# Patient Record
Sex: FEMALE | Race: WHITE
[De-identification: names, ages, dates, MRNs, and addresses within clinical notes are randomized per-mention and may not be internally consistent; named-entity substitution may affect disease eponyms.]

---
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
## 2024-03-19 ENCOUNTER — TRANSCRIPTION ENCOUNTER (OUTPATIENT)
Age: 19
End: 2024-03-19

## 2024-03-19 ENCOUNTER — OUTPATIENT (OUTPATIENT)
Dept: INPATIENT UNIT | Facility: HOSPITAL | Age: 19
LOS: 1 days | Discharge: HOME | End: 2024-03-19
Payer: MEDICAID

## 2024-03-19 VITALS
SYSTOLIC BLOOD PRESSURE: 115 MMHG | RESPIRATION RATE: 18 BRPM | TEMPERATURE: 99 F | DIASTOLIC BLOOD PRESSURE: 58 MMHG | HEIGHT: 65 IN | WEIGHT: 166.89 LBS | HEART RATE: 74 BPM

## 2024-03-19 DIAGNOSIS — O26.893 OTHER SPECIFIED PREGNANCY RELATED CONDITIONS, THIRD TRIMESTER: ICD-10-CM

## 2024-03-19 LAB
AMPHET UR-MCNC: NEGATIVE — SIGNIFICANT CHANGE UP
APPEARANCE UR: CLEAR — SIGNIFICANT CHANGE UP
BACTERIA # UR AUTO: NEGATIVE /HPF — SIGNIFICANT CHANGE UP
BARBITURATES UR SCN-MCNC: NEGATIVE — SIGNIFICANT CHANGE UP
BASOPHILS # BLD AUTO: 0.02 K/UL — SIGNIFICANT CHANGE UP (ref 0–0.2)
BASOPHILS NFR BLD AUTO: 0.2 % — SIGNIFICANT CHANGE UP (ref 0–1)
BENZODIAZ UR-MCNC: NEGATIVE — SIGNIFICANT CHANGE UP
BILIRUB UR-MCNC: NEGATIVE — SIGNIFICANT CHANGE UP
BLD GP AB SCN SERPL QL: SIGNIFICANT CHANGE UP
BUPRENORPHINE SCREEN, URINE RESULT: NEGATIVE — SIGNIFICANT CHANGE UP
CAST: 1 /LPF — SIGNIFICANT CHANGE UP (ref 0–4)
COCAINE METAB.OTHER UR-MCNC: NEGATIVE — SIGNIFICANT CHANGE UP
COLOR SPEC: YELLOW — SIGNIFICANT CHANGE UP
DIFF PNL FLD: NEGATIVE — SIGNIFICANT CHANGE UP
EOSINOPHIL # BLD AUTO: 0.02 K/UL — SIGNIFICANT CHANGE UP (ref 0–0.7)
EOSINOPHIL NFR BLD AUTO: 0.2 % — SIGNIFICANT CHANGE UP (ref 0–8)
FENTANYL UR QL: NEGATIVE — SIGNIFICANT CHANGE UP
GLUCOSE UR QL: NEGATIVE MG/DL — SIGNIFICANT CHANGE UP
HCT VFR BLD CALC: 36.6 % — LOW (ref 37–47)
HGB BLD-MCNC: 12.8 G/DL — SIGNIFICANT CHANGE UP (ref 12–16)
IMM GRANULOCYTES NFR BLD AUTO: 1 % — HIGH (ref 0.1–0.3)
KETONES UR-MCNC: NEGATIVE MG/DL — SIGNIFICANT CHANGE UP
L&D DRUG SCREEN, URINE: SIGNIFICANT CHANGE UP
LEUKOCYTE ESTERASE UR-ACNC: ABNORMAL
LYMPHOCYTES # BLD AUTO: 1.76 K/UL — SIGNIFICANT CHANGE UP (ref 1.2–3.4)
LYMPHOCYTES # BLD AUTO: 15.1 % — LOW (ref 20.5–51.1)
MCHC RBC-ENTMCNC: 32.4 PG — HIGH (ref 27–31)
MCHC RBC-ENTMCNC: 35 G/DL — SIGNIFICANT CHANGE UP (ref 32–37)
MCV RBC AUTO: 92.7 FL — SIGNIFICANT CHANGE UP (ref 81–99)
METHADONE UR-MCNC: NEGATIVE — SIGNIFICANT CHANGE UP
MONOCYTES # BLD AUTO: 0.74 K/UL — HIGH (ref 0.1–0.6)
MONOCYTES NFR BLD AUTO: 6.3 % — SIGNIFICANT CHANGE UP (ref 1.7–9.3)
NEUTROPHILS # BLD AUTO: 9.03 K/UL — HIGH (ref 1.4–6.5)
NEUTROPHILS NFR BLD AUTO: 77.2 % — HIGH (ref 42.2–75.2)
NITRITE UR-MCNC: NEGATIVE — SIGNIFICANT CHANGE UP
NRBC # BLD: 0 /100 WBCS — SIGNIFICANT CHANGE UP (ref 0–0)
OPIATES UR-MCNC: NEGATIVE — SIGNIFICANT CHANGE UP
OXYCODONE UR-MCNC: NEGATIVE — SIGNIFICANT CHANGE UP
PCP UR-MCNC: NEGATIVE — SIGNIFICANT CHANGE UP
PH UR: 7 — SIGNIFICANT CHANGE UP (ref 5–8)
PLATELET # BLD AUTO: 170 K/UL — SIGNIFICANT CHANGE UP (ref 130–400)
PMV BLD: 10.5 FL — HIGH (ref 7.4–10.4)
PRENATAL SYPHILIS TEST: SIGNIFICANT CHANGE UP
PROPOXYPHENE QUALITATIVE URINE RESULT: NEGATIVE — SIGNIFICANT CHANGE UP
PROT UR-MCNC: NEGATIVE MG/DL — SIGNIFICANT CHANGE UP
RBC # BLD: 3.95 M/UL — LOW (ref 4.2–5.4)
RBC # FLD: 12.8 % — SIGNIFICANT CHANGE UP (ref 11.5–14.5)
RBC CASTS # UR COMP ASSIST: 0 /HPF — SIGNIFICANT CHANGE UP (ref 0–4)
SP GR SPEC: 1 — SIGNIFICANT CHANGE UP (ref 1–1.03)
SQUAMOUS # UR AUTO: 5 /HPF — SIGNIFICANT CHANGE UP (ref 0–5)
UROBILINOGEN FLD QL: 0.2 MG/DL — SIGNIFICANT CHANGE UP (ref 0.2–1)
WBC # BLD: 11.69 K/UL — HIGH (ref 4.8–10.8)
WBC # FLD AUTO: 11.69 K/UL — HIGH (ref 4.8–10.8)
WBC UR QL: 3 /HPF — SIGNIFICANT CHANGE UP (ref 0–5)

## 2024-03-19 PROCEDURE — 86900 BLOOD TYPING SEROLOGIC ABO: CPT

## 2024-03-19 PROCEDURE — 80354 DRUG SCREENING FENTANYL: CPT

## 2024-03-19 PROCEDURE — 86592 SYPHILIS TEST NON-TREP QUAL: CPT

## 2024-03-19 PROCEDURE — 80307 DRUG TEST PRSMV CHEM ANLYZR: CPT

## 2024-03-19 PROCEDURE — 81001 URINALYSIS AUTO W/SCOPE: CPT

## 2024-03-19 PROCEDURE — 86850 RBC ANTIBODY SCREEN: CPT

## 2024-03-19 PROCEDURE — 85025 COMPLETE CBC W/AUTO DIFF WBC: CPT

## 2024-03-19 PROCEDURE — 36415 COLL VENOUS BLD VENIPUNCTURE: CPT

## 2024-03-19 PROCEDURE — 86901 BLOOD TYPING SEROLOGIC RH(D): CPT

## 2024-03-19 RX ORDER — AMPICILLIN TRIHYDRATE 250 MG
1 CAPSULE ORAL EVERY 4 HOURS
Refills: 0 | Status: DISCONTINUED | OUTPATIENT
Start: 2024-03-19 | End: 2024-03-19

## 2024-03-19 RX ORDER — INFLUENZA VIRUS VACCINE 15; 15; 15; 15 UG/.5ML; UG/.5ML; UG/.5ML; UG/.5ML
0.5 SUSPENSION INTRAMUSCULAR ONCE
Refills: 0 | Status: DISCONTINUED | OUTPATIENT
Start: 2024-03-19 | End: 2024-03-19

## 2024-03-19 RX ORDER — AMPICILLIN TRIHYDRATE 250 MG
2 CAPSULE ORAL ONCE
Refills: 0 | Status: DISCONTINUED | OUTPATIENT
Start: 2024-03-19 | End: 2024-03-19

## 2024-03-19 RX ORDER — SODIUM CHLORIDE 9 MG/ML
1000 INJECTION, SOLUTION INTRAVENOUS
Refills: 0 | Status: DISCONTINUED | OUTPATIENT
Start: 2024-03-19 | End: 2024-03-19

## 2024-03-19 RX ORDER — OXYTOCIN 10 UNIT/ML
333.33 VIAL (ML) INJECTION
Qty: 20 | Refills: 0 | Status: DISCONTINUED | OUTPATIENT
Start: 2024-03-19 | End: 2024-03-19

## 2024-03-19 NOTE — OB PROVIDER H&P - ATTENDING COMMENTS
18 yr old  at 39.3  wks sent by PMD for IOL for oligohydramnios.  Sono in office showed MVP  of 0.9 cm.   In hospital, found to have MVP of 5cm. Patient declines IOL.  - continue routine prenatal care  - warning signs reviewed

## 2024-03-19 NOTE — OB RN PATIENT PROFILE - NS_PRENATALSTART_OBGYN_ALL_OB
From: Lyudmila Zuñiga  To: Kodak Beatty  Sent: 1/4/2024 6:54 AM CST  Subject: LINGERING cold and Symptoms     Good Morning,  I have had a ge oral cold since Dec 9th. I still have residual cold symptoms and now I ha e a cold sore bump Ppaearing on my lower lip. Is there something thT you can.preacribe for the discomfort?    Thanks  Lyudmila  
Spoke to patient and informed her per Dr. Beatty,   Antiviral medications will not work now   She can try over-the-counter lip balm's for symptom relief from pain and cracking   It will subside by itself    Patient verbalized understanding.       
Started first trimester

## 2024-03-19 NOTE — OB PROVIDER H&P - HISTORY OF PRESENT ILLNESS
18 yr old  at 3  wks sent by PMD for IOL for oligohydramnios.  Sono in office showed MVP  of 0.9 cm.  Pt is GBS pos.    18 yr old  at 39.3  wks sent by PMD for IOL for oligohydramnios.  Sono in office showed MVP  of 0.9 cm. Pt had also mentioned that FM was still present but not as active as before.  Reports she feel normal FM since visit and active since arrival in L&D.

## 2024-03-19 NOTE — DISCHARGE NOTE ANTEPARTUM - CARE PROVIDER_API CALL
Silvia Saab  Obstetrics and Gynecology  [address] [place] [address], [address]  [place] [address]  Phone: [phone]  Fax: [phone]  Follow Up Time:

## 2024-03-19 NOTE — OB PROVIDER H&P - PRO PRENATAL LABS ORI SOURCE BLOOD TYPE
,   Please review the patient's incoming Conjectt message.     hard copy, drawn during this pregnancy

## 2024-03-19 NOTE — OB PROVIDER H&P - NSLOWPPHRISK_OBGYN_A_OB
No previous uterine incision/Avila Pregnancy/Less than or equal to 4 previous vaginal births/No known bleeding disorder/No history of postpartum hemorrhage/No other PPH risks indicated

## 2024-03-19 NOTE — OB PROVIDER H&P - ASSESSMENT
18 yr old  at  18 yr old  at 39w3d with MVP of 5.03 cm, reassuring maternal and fetal well-being.    d/c to home  Reviewed with pt normal MVP that IOL is not necessary  Reviewed findings with Dr Saab and Dr Caal- agree with plan  f/u PMD this week    Dr Saab aware  Dr Kierra Melgar aware

## 2024-03-19 NOTE — DISCHARGE NOTE ANTEPARTUM - CARE PLAN
1 Principal Discharge DX:	39 weeks gestation of pregnancy  Assessment and plan of treatment:	If you have severe or regular abdominal cramping, if you think you broke your water, or if you have vaginal bleeding, call your Ob/Gyn or come back to labor and delivery.  Follow up with your Ob/Gyn as scheduled.

## 2024-03-19 NOTE — OB PROVIDER H&P - NSHPPHYSICALEXAM_GEN_ALL_CORE
Vital Signs (24 Hrs):  T(C): 37 (03-19-24 @ 17:43), Max: 37 (03-19-24 @ 17:43)  HR: 74 (03-19-24 @ 17:43) (74 - 74)  BP: 115/58 (03-19-24 @ 17:43) (115/58 - 115/58)  RR: 18 (03-19-24 @ 17:43) (18 - 18)    Gen: NAD  Abd: gravid, soft NT  VE:deferred  FHR: 130/mod/+accels  TOCO: irritability

## 2024-03-19 NOTE — OB PROVIDER H&P - NSOBVTERISKREFER_OBGYN_ALL_OB
PROCEDURE  ECG 12 Lead Documentation  Date/Time: 3/7/2019 8:05 AM  Performed by: Jose Rushing MD  Authorized by: Jose Rushing MD     Indications / Diagnosis:  Diaphoresis  ECG reviewed by me, the ED Provider: yes    Patient location:  ED  Previous ECG:     Previous ECG:  Unavailable    Comparison to cardiac monitor: Yes    Interpretation:     Interpretation: non-specific    Quality:     Tracing quality:  Limited by artifact  Rate:     ECG rate:  91    ECG rate assessment: normal    Rhythm:     Rhythm: sinus rhythm    Ectopy:     Ectopy: none    QRS:     QRS axis:  Normal  Conduction:     Conduction: normal    ST segments:     ST segments:  Normal  T waves:     T waves: flattening      Flattening:  V1, I, aVF and aVR  Other findings:     Other findings: prolonged qTc interval           Jose Rushing MD  03/07/19 8754 Refer to the Assessment tab to view/cancel completed assessment.

## 2024-03-19 NOTE — DISCHARGE NOTE ANTEPARTUM - PATIENT PORTAL LINK FT
You can access the FollowMyHealth Patient Portal offered by [facility] by registering at the following website: [email or web address]. By joining [facility]’s FollowMyHealth portal, you will also be able to view your health information using other applications (apps) compatible with our system.

## 2024-03-19 NOTE — OB RN PATIENT PROFILE - HAS THE PATIENT RECEIVED THE INFLUENZA VACCINE THIS SEASON?
August 17, 2022     Patient: Lila Gonzalez  YOB: 1971  Date of Visit: 8/17/2022      To Whom it May Concern:    Esther Gonzalez is under my professional care  Lila Trotter was seen in my office on 8/17/2022  Lila Trotter will be excused from work starting from 08/15/2022  She may return back to work with no restrictions on 08/22/2022  If you have any questions or concerns, please don't hesitate to call           Sincerely,          Laisha Fleming MD        CC: No Recipients no...

## 2024-03-24 DIAGNOSIS — O99.820 STREPTOCOCCUS B CARRIER STATE COMPLICATING PREGNANCY: ICD-10-CM

## 2024-03-24 DIAGNOSIS — Z3A.39 39 WEEKS GESTATION OF PREGNANCY: ICD-10-CM

## 2024-03-24 DIAGNOSIS — Z88.1 ALLERGY STATUS TO OTHER ANTIBIOTIC AGENTS STATUS: ICD-10-CM

## 2024-03-24 DIAGNOSIS — O41.03X0 OLIGOHYDRAMNIOS, THIRD TRIMESTER, NOT APPLICABLE OR UNSPECIFIED: ICD-10-CM

## 2024-03-24 DIAGNOSIS — Z53.29 PROCEDURE AND TREATMENT NOT CARRIED OUT BECAUSE OF PATIENT'S DECISION FOR OTHER REASONS: ICD-10-CM

## 2024-03-28 ENCOUNTER — INPATIENT (INPATIENT)
Facility: HOSPITAL | Age: 19
LOS: 0 days | Discharge: HOME | DRG: 566 | End: 2024-03-29
Attending: OBSTETRICS & GYNECOLOGY | Admitting: OBSTETRICS & GYNECOLOGY
Payer: MEDICAID

## 2024-03-28 VITALS
SYSTOLIC BLOOD PRESSURE: 94 MMHG | WEIGHT: 167.99 LBS | TEMPERATURE: 98 F | DIASTOLIC BLOOD PRESSURE: 52 MMHG | HEIGHT: 65 IN | RESPIRATION RATE: 20 BRPM | HEART RATE: 67 BPM

## 2024-03-28 DIAGNOSIS — O26.899 OTHER SPECIFIED PREGNANCY RELATED CONDITIONS, UNSPECIFIED TRIMESTER: ICD-10-CM

## 2024-03-28 DIAGNOSIS — O26.893 OTHER SPECIFIED PREGNANCY RELATED CONDITIONS, THIRD TRIMESTER: ICD-10-CM

## 2024-03-28 PROBLEM — Z78.9 OTHER SPECIFIED HEALTH STATUS: Chronic | Status: ACTIVE | Noted: 2024-03-19

## 2024-03-28 LAB
APPEARANCE UR: ABNORMAL
BASOPHILS # BLD AUTO: 0.04 K/UL — SIGNIFICANT CHANGE UP (ref 0–0.2)
BASOPHILS NFR BLD AUTO: 0.3 % — SIGNIFICANT CHANGE UP (ref 0–1)
BILIRUB UR-MCNC: NEGATIVE — SIGNIFICANT CHANGE UP
COLOR SPEC: YELLOW — SIGNIFICANT CHANGE UP
DIFF PNL FLD: ABNORMAL
EOSINOPHIL # BLD AUTO: 0.03 K/UL — SIGNIFICANT CHANGE UP (ref 0–0.7)
EOSINOPHIL NFR BLD AUTO: 0.2 % — SIGNIFICANT CHANGE UP (ref 0–8)
GLUCOSE UR QL: NEGATIVE MG/DL — SIGNIFICANT CHANGE UP
HCT VFR BLD CALC: 37.2 % — SIGNIFICANT CHANGE UP (ref 37–47)
HGB BLD-MCNC: 12.9 G/DL — SIGNIFICANT CHANGE UP (ref 12–16)
IMM GRANULOCYTES NFR BLD AUTO: 0.6 % — HIGH (ref 0.1–0.3)
KETONES UR-MCNC: NEGATIVE MG/DL — SIGNIFICANT CHANGE UP
L&D DRUG SCREEN, URINE: SIGNIFICANT CHANGE UP
LEUKOCYTE ESTERASE UR-ACNC: ABNORMAL
LYMPHOCYTES # BLD AUTO: 1.72 K/UL — SIGNIFICANT CHANGE UP (ref 1.2–3.4)
LYMPHOCYTES # BLD AUTO: 12.5 % — LOW (ref 20.5–51.1)
MCHC RBC-ENTMCNC: 31.9 PG — HIGH (ref 27–31)
MCHC RBC-ENTMCNC: 34.7 G/DL — SIGNIFICANT CHANGE UP (ref 32–37)
MCV RBC AUTO: 92.1 FL — SIGNIFICANT CHANGE UP (ref 81–99)
MONOCYTES # BLD AUTO: 0.61 K/UL — HIGH (ref 0.1–0.6)
MONOCYTES NFR BLD AUTO: 4.4 % — SIGNIFICANT CHANGE UP (ref 1.7–9.3)
NEUTROPHILS # BLD AUTO: 11.31 K/UL — HIGH (ref 1.4–6.5)
NEUTROPHILS NFR BLD AUTO: 82 % — HIGH (ref 42.2–75.2)
NITRITE UR-MCNC: NEGATIVE — SIGNIFICANT CHANGE UP
NRBC # BLD: 0 /100 WBCS — SIGNIFICANT CHANGE UP (ref 0–0)
PH UR: 7 — SIGNIFICANT CHANGE UP (ref 5–8)
PLATELET # BLD AUTO: 142 K/UL — SIGNIFICANT CHANGE UP (ref 130–400)
PMV BLD: 10 FL — SIGNIFICANT CHANGE UP (ref 7.4–10.4)
PRENATAL SYPHILIS TEST: SIGNIFICANT CHANGE UP
PROT UR-MCNC: SIGNIFICANT CHANGE UP MG/DL
RBC # BLD: 4.04 M/UL — LOW (ref 4.2–5.4)
RBC # FLD: 12.8 % — SIGNIFICANT CHANGE UP (ref 11.5–14.5)
SP GR SPEC: 1.01 — SIGNIFICANT CHANGE UP (ref 1–1.03)
UROBILINOGEN FLD QL: 0.2 MG/DL — SIGNIFICANT CHANGE UP (ref 0.2–1)
WBC # BLD: 13.79 K/UL — HIGH (ref 4.8–10.8)
WBC # FLD AUTO: 13.79 K/UL — HIGH (ref 4.8–10.8)

## 2024-03-28 PROCEDURE — 86592 SYPHILIS TEST NON-TREP QUAL: CPT

## 2024-03-28 PROCEDURE — 86900 BLOOD TYPING SEROLOGIC ABO: CPT

## 2024-03-28 PROCEDURE — 86901 BLOOD TYPING SEROLOGIC RH(D): CPT

## 2024-03-28 PROCEDURE — 80307 DRUG TEST PRSMV CHEM ANLYZR: CPT

## 2024-03-28 PROCEDURE — 59050 FETAL MONITOR W/REPORT: CPT

## 2024-03-28 PROCEDURE — 36415 COLL VENOUS BLD VENIPUNCTURE: CPT

## 2024-03-28 PROCEDURE — 85025 COMPLETE CBC W/AUTO DIFF WBC: CPT

## 2024-03-28 PROCEDURE — 81001 URINALYSIS AUTO W/SCOPE: CPT

## 2024-03-28 PROCEDURE — 86850 RBC ANTIBODY SCREEN: CPT

## 2024-03-28 PROCEDURE — 80354 DRUG SCREENING FENTANYL: CPT

## 2024-03-28 RX ORDER — OXYTOCIN 10 UNIT/ML
333.33 VIAL (ML) INJECTION
Qty: 20 | Refills: 0 | Status: DISCONTINUED | OUTPATIENT
Start: 2024-03-28 | End: 2024-03-28

## 2024-03-28 RX ORDER — CHLORHEXIDINE GLUCONATE 213 G/1000ML
1 SOLUTION TOPICAL DAILY
Refills: 0 | Status: DISCONTINUED | OUTPATIENT
Start: 2024-03-28 | End: 2024-03-28

## 2024-03-28 RX ORDER — SODIUM CHLORIDE 9 MG/ML
1000 INJECTION, SOLUTION INTRAVENOUS
Refills: 0 | Status: DISCONTINUED | OUTPATIENT
Start: 2024-03-28 | End: 2024-03-28

## 2024-03-28 RX ORDER — LANOLIN
1 OINTMENT (GRAM) TOPICAL EVERY 6 HOURS
Refills: 0 | Status: DISCONTINUED | OUTPATIENT
Start: 2024-03-28 | End: 2024-03-29

## 2024-03-28 RX ORDER — IBUPROFEN 200 MG
600 TABLET ORAL EVERY 6 HOURS
Refills: 0 | Status: COMPLETED | OUTPATIENT
Start: 2024-03-28 | End: 2025-02-24

## 2024-03-28 RX ORDER — INFLUENZA VIRUS VACCINE 15; 15; 15; 15 UG/.5ML; UG/.5ML; UG/.5ML; UG/.5ML
0.5 SUSPENSION INTRAMUSCULAR ONCE
Refills: 0 | Status: DISCONTINUED | OUTPATIENT
Start: 2024-03-28 | End: 2024-03-29

## 2024-03-28 RX ORDER — TETANUS TOXOID, REDUCED DIPHTHERIA TOXOID AND ACELLULAR PERTUSSIS VACCINE, ADSORBED 5; 2.5; 8; 8; 2.5 [IU]/.5ML; [IU]/.5ML; UG/.5ML; UG/.5ML; UG/.5ML
0.5 SUSPENSION INTRAMUSCULAR ONCE
Refills: 0 | Status: DISCONTINUED | OUTPATIENT
Start: 2024-03-28 | End: 2024-03-29

## 2024-03-28 RX ORDER — AMPICILLIN TRIHYDRATE 250 MG
1 CAPSULE ORAL EVERY 4 HOURS
Refills: 0 | Status: DISCONTINUED | OUTPATIENT
Start: 2024-03-28 | End: 2024-03-28

## 2024-03-28 RX ORDER — CITRIC ACID/SODIUM CITRATE 300-500 MG
15 SOLUTION, ORAL ORAL EVERY 6 HOURS
Refills: 0 | Status: DISCONTINUED | OUTPATIENT
Start: 2024-03-28 | End: 2024-03-28

## 2024-03-28 RX ORDER — SODIUM CHLORIDE 9 MG/ML
3 INJECTION INTRAMUSCULAR; INTRAVENOUS; SUBCUTANEOUS EVERY 8 HOURS
Refills: 0 | Status: DISCONTINUED | OUTPATIENT
Start: 2024-03-28 | End: 2024-03-29

## 2024-03-28 RX ORDER — OXYCODONE HYDROCHLORIDE 5 MG/1
5 TABLET ORAL
Refills: 0 | Status: DISCONTINUED | OUTPATIENT
Start: 2024-03-28 | End: 2024-03-29

## 2024-03-28 RX ORDER — AMPICILLIN TRIHYDRATE 250 MG
2 CAPSULE ORAL ONCE
Refills: 0 | Status: COMPLETED | OUTPATIENT
Start: 2024-03-28 | End: 2024-03-28

## 2024-03-28 RX ORDER — OXYTOCIN 10 UNIT/ML
2 VIAL (ML) INJECTION
Qty: 30 | Refills: 0 | Status: DISCONTINUED | OUTPATIENT
Start: 2024-03-28 | End: 2024-03-28

## 2024-03-28 RX ORDER — OXYCODONE HYDROCHLORIDE 5 MG/1
5 TABLET ORAL ONCE
Refills: 0 | Status: DISCONTINUED | OUTPATIENT
Start: 2024-03-28 | End: 2024-03-29

## 2024-03-28 RX ORDER — IBUPROFEN 200 MG
600 TABLET ORAL EVERY 6 HOURS
Refills: 0 | Status: DISCONTINUED | OUTPATIENT
Start: 2024-03-28 | End: 2024-03-29

## 2024-03-28 RX ORDER — PRAMOXINE HYDROCHLORIDE 150 MG/15G
1 AEROSOL, FOAM RECTAL EVERY 4 HOURS
Refills: 0 | Status: DISCONTINUED | OUTPATIENT
Start: 2024-03-28 | End: 2024-03-29

## 2024-03-28 RX ORDER — DIBUCAINE 1 %
1 OINTMENT (GRAM) RECTAL EVERY 6 HOURS
Refills: 0 | Status: DISCONTINUED | OUTPATIENT
Start: 2024-03-28 | End: 2024-03-29

## 2024-03-28 RX ORDER — HYDROCORTISONE 1 %
1 OINTMENT (GRAM) TOPICAL EVERY 6 HOURS
Refills: 0 | Status: DISCONTINUED | OUTPATIENT
Start: 2024-03-28 | End: 2024-03-29

## 2024-03-28 RX ORDER — AER TRAVELER 0.5 G/1
1 SOLUTION RECTAL; TOPICAL EVERY 4 HOURS
Refills: 0 | Status: DISCONTINUED | OUTPATIENT
Start: 2024-03-28 | End: 2024-03-29

## 2024-03-28 RX ORDER — KETOROLAC TROMETHAMINE 30 MG/ML
30 SYRINGE (ML) INJECTION ONCE
Refills: 0 | Status: DISCONTINUED | OUTPATIENT
Start: 2024-03-28 | End: 2024-03-28

## 2024-03-28 RX ORDER — DIPHENHYDRAMINE HCL 50 MG
25 CAPSULE ORAL EVERY 6 HOURS
Refills: 0 | Status: DISCONTINUED | OUTPATIENT
Start: 2024-03-28 | End: 2024-03-29

## 2024-03-28 RX ORDER — SIMETHICONE 80 MG/1
80 TABLET, CHEWABLE ORAL EVERY 4 HOURS
Refills: 0 | Status: DISCONTINUED | OUTPATIENT
Start: 2024-03-28 | End: 2024-03-29

## 2024-03-28 RX ORDER — OXYTOCIN 10 UNIT/ML
41.67 VIAL (ML) INJECTION
Qty: 20 | Refills: 0 | Status: DISCONTINUED | OUTPATIENT
Start: 2024-03-28 | End: 2024-03-29

## 2024-03-28 RX ORDER — MAGNESIUM HYDROXIDE 400 MG/1
30 TABLET, CHEWABLE ORAL
Refills: 0 | Status: DISCONTINUED | OUTPATIENT
Start: 2024-03-28 | End: 2024-03-29

## 2024-03-28 RX ORDER — BENZOCAINE 10 %
1 GEL (GRAM) MUCOUS MEMBRANE EVERY 6 HOURS
Refills: 0 | Status: DISCONTINUED | OUTPATIENT
Start: 2024-03-28 | End: 2024-03-29

## 2024-03-28 RX ORDER — ACETAMINOPHEN 500 MG
975 TABLET ORAL
Refills: 0 | Status: DISCONTINUED | OUTPATIENT
Start: 2024-03-28 | End: 2024-03-29

## 2024-03-28 RX ADMIN — SODIUM CHLORIDE 125 MILLILITER(S): 9 INJECTION, SOLUTION INTRAVENOUS at 04:40

## 2024-03-28 RX ADMIN — Medication 2 MILLIUNIT(S)/MIN: at 11:02

## 2024-03-28 RX ADMIN — Medication 108 GRAM(S): at 12:20

## 2024-03-28 RX ADMIN — Medication 600 MILLIGRAM(S): at 23:00

## 2024-03-28 RX ADMIN — Medication 108 GRAM(S): at 08:26

## 2024-03-28 RX ADMIN — Medication 200 GRAM(S): at 04:20

## 2024-03-28 RX ADMIN — Medication 600 MILLIGRAM(S): at 21:58

## 2024-03-28 NOTE — OB PROVIDER H&P - ATTENDING COMMENTS
17yo  at 40w4d gestation who presented to L&D with SROM, in early labor.  Reassuring fetal status  GBS positive  Admit to L&D  Antibiotics for GBS prophylaxis  Epidural if desired for pain control  Pitocin if needed for augmentation  Anticipate vaginal delivery

## 2024-03-28 NOTE — OB PROVIDER H&P - HISTORY OF PRESENT ILLNESS
18yr old  at 40w4d, NICHOLE 3/24/24, dated by 1st trimester sono, presents to L&D for contractions. Pt had a large gush of fluid while in registration c/w SROM. Pt reports good fetal movement. She denies vaginal bleeding. She reports an uncomplicated pregnancy otherwise. GBS positive.

## 2024-03-28 NOTE — OB PROVIDER DELIVERY SUMMARY - NSSELHIDDEN_OBGYN_ALL_OB_FT
[NS_DeliveryAttending1_OBGYN_ALL_OB_FT:[id number]=],[NS_DeliveryRN_OBGYN_ALL_OB_FT:[id number]]

## 2024-03-28 NOTE — OB RN PATIENT PROFILE - FALL HARM RISK - UNIVERSAL INTERVENTIONS
Bed in lowest position, wheels locked, appropriate side rails in place/Call bell, personal items and telephone in reach/Instruct patient to call for assistance before getting out of bed or chair/Non-slip footwear when patient is out of bed/[place] to call system/Physically safe environment - no spills, clutter or unnecessary equipment/Purposeful Proactive Rounding/Room/bathroom lighting operational, light cord in reach

## 2024-03-28 NOTE — OB PROVIDER LABOR PROGRESS NOTE - NS_SUBJECTIVE/OBJECTIVE_OBGYN_ALL_OB_FT
Patient comfortable, s/p epidural    Vital Signs Last 24 Hrs  T(C): 36.5 (28 Mar 2024 05:36), Max: 36.5 (28 Mar 2024 05:36)  T(F): 97.7 (28 Mar 2024 05:36), Max: 97.7 (28 Mar 2024 05:36)  HR: 74 (28 Mar 2024 08:51) (50 - 84)  BP: 117/67 (28 Mar 2024 08:48) (88/49 - 124/70)  BP(mean): --  RR: 18 (28 Mar 2024 06:47) (18 - 20)  SpO2: 97% (28 Mar 2024 08:51) (91% - 99%)      Abdomen: soft between contractions, nontender, gravid
Patient feeling increased pressure.    Vital Signs Last 24 Hrs  T(C): 37.1 (28 Mar 2024 11:00), Max: 37.1 (28 Mar 2024 11:00)  T(F): 98.78 (28 Mar 2024 11:00), Max: 98.78 (28 Mar 2024 11:00)  HR: 78 (28 Mar 2024 12:36) (50 - 112)  BP: 113/57 (28 Mar 2024 12:32) (88/49 - 137/61)  BP(mean): --  RR: 18 (28 Mar 2024 06:47) (18 - 20)  SpO2: 95% (28 Mar 2024 12:36) (88% - 99%)

## 2024-03-28 NOTE — OB PROVIDER H&P - NSLOWPPHRISK_OBGYN_A_OB_CAL
renal replacement pts:no protein restr,no conc K & phos, low sodium/supplement (specify)/thin liquids 6

## 2024-03-28 NOTE — OB PROVIDER LABOR PROGRESS NOTE - NS_OBIHIFHRDETAILS_OBGYN_ALL_OB_FT
baseline 125, moderate variability, +accelerations, rare early decelerations
baseline 135, moderate variability, +accelerations, one late deceleration lasting 2 minutes with recovery after attempt to push

## 2024-03-28 NOTE — OB PROVIDER DELIVERY SUMMARY - NSPROVIDERDELIVERYNOTE_OBGYN_ALL_OB_FT
I was at the bedside of this 19 yo  at 40 5/7 weeks gestational age in active labor when the vaginal exam was completely dilated, completely effaced and +2 of 3 station. The patient was placed in dorsal lithotomy position and prepared and draped in the normal fashion. Under continuous external fetal heart rate monitoring, the patient was encouraged to push. With good maternal effort and vacuum assistance for maternal and fetal indications, she delivered a viable male infant with APGARs of 9 and 9, weighing 3775g.    Due to maternal exhaustion, persistent cat II FHT tracing, delivery assisted by Kiwi vacuum was offered. The patient was counseled and indications and risks were discussed. Questions were answered and the patient's consent was obtained. At this point, the cervix was completely dilated. Nursery and anesthesia were notified. Maternal fetal sizes were determined appropriate for application and the bladder had been emptied. The fetal station was noted to be low +3 and the fetal head was noted to be in the OA position. Kiwi vacuum was applied to the fetal head. Cup placement was determined to be appropriate and maternal tissue was noted to be excluded from the cup. The vacuum was placed on the fetal head and the pressure was kept in the recommended range per manufacture's instructions. Station was advanced with each pull. Total traction time was 1 minute with 2 pulls and 1 pop offs. Vacuum extraction of the head was successful and  head assessment was caput.    The fetal head delivered first in OA position.  Nuchal cord was not present.  Then, with gentle downward guidance, the right anterior fetal shoulder was delivered, followed by the left posterior fetal shoulder, followed by the remainder of the infant without difficulty.    The umbilical cord was clamped x 2 and cut in between.  The infant was vigorous and handed to pediatric team for assessment.    Cord blood/cord gasses were then collected.  The placenta delivered spontaneously and intact.  Three-vessel cord was noted. Pitocin infusion was started immediately after the delivery of the placenta. Uterine massage was given until the fundus was firm.     The cervix, perineum, vagina, and rectum were then carefully inspected for lacerations.  A second degree perineal laceration was noted and repaired using 2-0 chromic in routine fashion. Hemostasis was noted.    At the end of the procedure, all sponge, instrument, needle counts were correct x2. No sponges were left in the vagina.     was taken to NICU at the end of procedure. Patient was left in stable condition in the labor room.

## 2024-03-28 NOTE — OB PROVIDER H&P - NSHPLABSRESULTS_GEN_ALL_CORE
2/26/24  HIV NR  O POS, antibody negative  RPR NR  Rubella immune  Measles immune  Varicella nonimmune  Hep B NR    8/30/23  GBS bacteruria

## 2024-03-28 NOTE — OB PROVIDER H&P - NSHPPHYSICALEXAM_GEN_ALL_CORE
Physical Exam  Vital Signs Last 24 Hrs  HR: 67 (28 Mar 2024 03:51) (67 - 67)  BP: 88/49 (28 Mar 2024 03:51) (88/49 - 88/49)    Gen: NAD, AOx3  Abdomen: soft, gravid, nontender, no palpable contractions    EFM: 120bpm/moderate variability/+accels  [place]: q irregular  SVE: 4/80/-2    BSS: cephalic

## 2024-03-28 NOTE — OB RN PATIENT PROFILE - FUNCTIONAL ASSESSMENT - BASIC MOBILITY SCORE.
Quality 265: Biopsy Follow-Up: Biopsy results reviewed, communicated, tracked, and documented Add 14975 Cpt? (Important Note: In 2017 The Use Of 38162 Is Being Tracked By Cms To Determine Future Global Period Reimbursement For Global Periods): yes Detail Level: Zone 24

## 2024-03-28 NOTE — PROCEDURE NOTE - NSPROCNAME_GEN_A_CORE
Labor Anesthesia Posture, length, shape, position symmetric and appropriate for age/Movement patterns with normal strength and range of motion

## 2024-03-28 NOTE — OB PROVIDER LABOR PROGRESS NOTE - ASSESSMENT
19 yo G1 at 40w5d in labor. FHT cat 2, overall reassuring. Maternal VS wnl.   2. SROM 0330 3/28  3. GBS positive  4. Rh positive  5. Cephalic presentation    -Continue admission on L&D  -Continuous EFM, toco, maternal VS monitoring  -clear liquid diet, IV fluids  -Continue ampicillin for GBS prophylaxis  -Plan to labor down, allow for continued FHT recovery in interim as FHT now overall reassuring  -Repeat SVE in 1 hour, anticipate vaginal delivery
19 yo G1 at 40w5d in labor. FHT cat 1. Maternal VS wnl.   2. SROM 0330 3/28  3. GBS positive  4. Rh positive  5. Cephalic presentation    -Continue admission on L&D  -Continuous EFM, toco, maternal VS monitoring  -clear liquid diet, IV fluids  -Continue ampicillin for GBS prophylaxis  -Repeat SVE as clinically indicated  -Start pitocin as clinically indicated

## 2024-03-28 NOTE — OB PROVIDER H&P - ASSESSMENT
18y  @ 40w4d, GBS positive, s/p SROM, in early labor.     -Admit to L & D  -IV hydration, labs  -IV antibiotics  -Continuous EFM & TOCO monitoring  -Clear Liquid diet  -Pain Management PRN    Dr. Cohn and Dr. Calderon aware

## 2024-03-28 NOTE — OB RN DELIVERY SUMMARY - NSSELHIDDEN_OBGYN_ALL_OB_FT
[NS_DeliveryAttending1_OBGYN_ALL_OB_FT:[id number]],[NS_DeliveryRN_OBGYN_ALL_OB_FT:[id number]]

## 2024-03-29 ENCOUNTER — TRANSCRIPTION ENCOUNTER (OUTPATIENT)
Age: 19
End: 2024-03-29

## 2024-03-29 VITALS
RESPIRATION RATE: 18 BRPM | SYSTOLIC BLOOD PRESSURE: 116 MMHG | HEART RATE: 69 BPM | DIASTOLIC BLOOD PRESSURE: 54 MMHG | TEMPERATURE: 99 F

## 2024-03-29 LAB
BASOPHILS # BLD AUTO: 0.04 K/UL — SIGNIFICANT CHANGE UP (ref 0–0.2)
BASOPHILS NFR BLD AUTO: 0.3 % — SIGNIFICANT CHANGE UP (ref 0–1)
EOSINOPHIL # BLD AUTO: 0.06 K/UL — SIGNIFICANT CHANGE UP (ref 0–0.7)
EOSINOPHIL NFR BLD AUTO: 0.4 % — SIGNIFICANT CHANGE UP (ref 0–8)
HCT VFR BLD CALC: 30.2 % — LOW (ref 37–47)
HGB BLD-MCNC: 10.5 G/DL — LOW (ref 12–16)
IMM GRANULOCYTES NFR BLD AUTO: 0.7 % — HIGH (ref 0.1–0.3)
LYMPHOCYTES # BLD AUTO: 17.3 % — LOW (ref 20.5–51.1)
LYMPHOCYTES # BLD AUTO: 2.64 K/UL — SIGNIFICANT CHANGE UP (ref 1.2–3.4)
MCHC RBC-ENTMCNC: 32.5 PG — HIGH (ref 27–31)
MCHC RBC-ENTMCNC: 34.8 G/DL — SIGNIFICANT CHANGE UP (ref 32–37)
MCV RBC AUTO: 93.5 FL — SIGNIFICANT CHANGE UP (ref 81–99)
MONOCYTES # BLD AUTO: 1.03 K/UL — HIGH (ref 0.1–0.6)
MONOCYTES NFR BLD AUTO: 6.7 % — SIGNIFICANT CHANGE UP (ref 1.7–9.3)
NEUTROPHILS # BLD AUTO: 11.42 K/UL — HIGH (ref 1.4–6.5)
NEUTROPHILS NFR BLD AUTO: 74.6 % — SIGNIFICANT CHANGE UP (ref 42.2–75.2)
NRBC # BLD: 0 /100 WBCS — SIGNIFICANT CHANGE UP (ref 0–0)
PLATELET # BLD AUTO: 131 K/UL — SIGNIFICANT CHANGE UP (ref 130–400)
PMV BLD: 10.5 FL — HIGH (ref 7.4–10.4)
RBC # BLD: 3.23 M/UL — LOW (ref 4.2–5.4)
RBC # FLD: 12.9 % — SIGNIFICANT CHANGE UP (ref 11.5–14.5)
WBC # BLD: 15.29 K/UL — HIGH (ref 4.8–10.8)
WBC # FLD AUTO: 15.29 K/UL — HIGH (ref 4.8–10.8)

## 2024-03-29 RX ORDER — IBUPROFEN 200 MG
1 TABLET ORAL
Qty: 0 | Refills: 0 | DISCHARGE
Start: 2024-03-29

## 2024-03-29 RX ORDER — ACETAMINOPHEN 500 MG
3 TABLET ORAL
Qty: 0 | Refills: 0 | DISCHARGE
Start: 2024-03-29

## 2024-03-29 RX ADMIN — SODIUM CHLORIDE 3 MILLILITER(S): 9 INJECTION INTRAMUSCULAR; INTRAVENOUS; SUBCUTANEOUS at 05:41

## 2024-03-29 RX ADMIN — SODIUM CHLORIDE 3 MILLILITER(S): 9 INJECTION INTRAMUSCULAR; INTRAVENOUS; SUBCUTANEOUS at 02:34

## 2024-03-29 RX ADMIN — Medication 600 MILLIGRAM(S): at 06:52

## 2024-03-29 RX ADMIN — Medication 975 MILLIGRAM(S): at 10:15

## 2024-03-29 RX ADMIN — Medication 600 MILLIGRAM(S): at 10:14

## 2024-03-29 NOTE — PROGRESS NOTE ADULT - SUBJECTIVE AND OBJECTIVE BOX
Patient evaluated at bedside this morning, resting comfortable in bed, no acute events overnight.  She reports pain is well controlled with tylenol and motrin.  She denies headache, dizziness, chest pain, palpitations, shortness of breath, nausea, vomiting, heavy vaginal bleeding or perineal discomfort. Reports decrease in amount of vaginal bleeding and denies clots.  She has been ambulating without assistance, voiding spontaneously, and is breastfeeding.   Tolerating food well, without nausea/vomit.  Passing flatus.     Physical Exam:  T(C): 36.5 (03-29-24 @ 08:25), Max: 36.8 (03-28-24 @ 23:46)  HR: 67 (03-29-24 @ 08:25) (65 - 67)  BP: 84/49 (03-29-24 @ 08:25) (84/49 - 96/53)  RR: 18 (03-29-24 @ 08:25) (16 - 18)  SpO2: --    GA: NAD, A&O x 3  CV: RRR, no murmurs, rubs, or gallops  Pulm: clear breath sounds throughout, no rales, rhonchi, wheezes  Abd: + BS, soft, nontender, nondistended, no rebound or guarding, uterus firm at midline and below umbilicus  Extremities: no swelling or calf tenderness  Perineum: normal lochia, intact, healing well, no hematoma                          10.5   15.29 )-----------( 131      ( 29 Mar 2024 06:27 )             30.2           acetaminophen     Tablet .. 975 milliGRAM(s) Oral <User Schedule>  benzocaine 20%/menthol 0.5% Spray 1 Spray(s) Topical every 6 hours PRN  dibucaine 1% Ointment 1 Application(s) Topical every 6 hours PRN  diphenhydrAMINE 25 milliGRAM(s) Oral every 6 hours PRN  diphtheria/tetanus/pertussis (acellular) Vaccine (Adacel) 0.5 milliLiter(s) IntraMuscular once  hydrocortisone 1% Cream 1 Application(s) Topical every 6 hours PRN  ibuprofen  Tablet. 600 milliGRAM(s) Oral every 6 hours  influenza   Vaccine 0.5 milliLiter(s) IntraMuscular once  lanolin Ointment 1 Application(s) Topical every 6 hours PRN  magnesium hydroxide Suspension 30 milliLiter(s) Oral two times a day PRN  oxyCODONE    IR 5 milliGRAM(s) Oral once PRN  oxyCODONE    IR 5 milliGRAM(s) Oral every 3 hours PRN  oxytocin Infusion 41.667 milliUNIT(s)/Min IV Continuous <Continuous>  pramoxine 1%/zinc 5% Cream 1 Application(s) Topical every 4 hours PRN  prenatal multivitamin 1 Tablet(s) Oral daily  simethicone 80 milliGRAM(s) Chew every 4 hours PRN  sodium chloride 0.9% lock flush 3 milliLiter(s) IV Push every 8 hours  witch hazel Pads 1 Application(s) Topical every 4 hours PRN

## 2024-03-29 NOTE — DISCHARGE NOTE OB - CARE PROVIDER_API CALL
Isaiah Duncan  Obstetrics and Gynecology  [address] [place] [address], [address]  [place] [address]  Phone: [phone]  Fax: [phone]  Follow Up Time:

## 2024-03-29 NOTE — DISCHARGE NOTE OB - PATIENT PORTAL LINK FT
You can access the FollowMyHealth Patient Portal offered by [facility] by registering at the following website: [email or web address]. By joining [facility]’s FollowMyHealth portal, you will also be able to view your health information using other applications (apps) compatible with our system.

## 2024-03-29 NOTE — DISCHARGE NOTE OB - CARE PLAN
1 Principal Discharge DX:	Vacuum-assisted vaginal delivery  Assessment and plan of treatment:	Nothing in the vagina for 6 weeks (no sex, no tampons, no douching). Avoid tub baths, you may shower.  If you have a fever of 100.4F or greater, severe vaginal bleeding, or severe abdominal pain, call your Ob/Gyn or come to the emergency department immediately.  Please follow up with your provider in 6 weeks for postpartum visit.

## 2024-03-29 NOTE — PROGRESS NOTE ADULT - ASSESSMENT
A/P 18y s/p , PPD #1  , stable, meeting postpartum milestones   - Pain: well controlled on motrin and tylenol  - GI: Tolerating regular diet   - : urinating without difficulty/pain  - DVT prophylaxis: ambulating frequently  - Dispo: PPD 2, unless otherwise specified   A/P 18y s/p VAVD, PPD #1  , stable, meeting postpartum milestones   - Pain: well controlled on motrin and tylenol  - GI: Tolerating regular diet   - : urinating without difficulty/pain  - DVT prophylaxis: ambulating frequently  - Dispo: PPD 2, unless otherwise specified

## 2024-03-29 NOTE — DISCHARGE NOTE OB - HOSPITAL COURSE
24 @ 09:11    HPI:  18yr old  at 40w4d, NICHOLE 3/24/24, dated by 1st trimester sono, presents to L&D for contractions. Pt had a large gush of fluid while in registration c/w SROM. Pt reports good fetal movement. She denies vaginal bleeding. She reports an uncomplicated pregnancy otherwise. GBS positive.  (28 Mar 2024 03:44)      PAST MEDICAL & SURGICAL HISTORY:  No pertinent past medical history      No significant past surgical history          POST PARTUM COURSE:   Uncomplicated       LABS:                        10.5   15.29 )-----------( 131      ( 29 Mar 2024 06:27 )             30.2                         12.9   13.79 )-----------( 142      ( 28 Mar 2024 03:59 )             37.2                   Allergies    doxycycline (Rash)    Intolerances

## 2024-04-05 DIAGNOSIS — Z28.21 IMMUNIZATION NOT CARRIED OUT BECAUSE OF PATIENT REFUSAL: ICD-10-CM

## 2024-04-05 DIAGNOSIS — O48.0 POST-TERM PREGNANCY: ICD-10-CM

## 2024-04-05 DIAGNOSIS — Z3A.40 40 WEEKS GESTATION OF PREGNANCY: ICD-10-CM

## 2025-01-21 NOTE — DISCHARGE NOTE ANTEPARTUM - NSDCQMCOGNITION_NEU_ALL_CORE
Anesthesia Evaluation     Patient summary reviewed and Nursing notes reviewed   no history of anesthetic complications:                Airway   Mallampati: I  TM distance: >3 FB  Neck ROM: full  No difficulty expected  Dental - normal exam     Pulmonary - negative pulmonary ROS and normal exam   Cardiovascular - normal exam  Exercise tolerance: good (4-7 METS)    NYHA Classification: II    (+) hypertension, hyperlipidemia      Neuro/Psych- negative ROS  GI/Hepatic/Renal/Endo    (+) GERD, diabetes mellitus    Musculoskeletal     (+) neck pain  Abdominal  - normal exam    Bowel sounds: normal.   Substance History - negative use     OB/GYN negative ob/gyn ROS         Other   arthritis,                   Anesthesia Plan    ASA 3     general     intravenous induction     Anesthetic plan, risks, benefits, and alternatives have been provided, discussed and informed consent has been obtained with: patient.    CODE STATUS:          No difficulties

## 2025-03-17 NOTE — OB PROVIDER DELIVERY SUMMARY - NSVAGINALEXAMCERT_OBGYN_ALL_OB
2 seconds or less
The Delivery OB Provider certifies that vaginal examination and/or abdominal examination after the delivery was done and no foreign body was found.